# Patient Record
Sex: MALE | Race: WHITE | NOT HISPANIC OR LATINO | Employment: FULL TIME | RURAL
[De-identification: names, ages, dates, MRNs, and addresses within clinical notes are randomized per-mention and may not be internally consistent; named-entity substitution may affect disease eponyms.]

---

## 2020-12-05 ENCOUNTER — HISTORICAL (OUTPATIENT)
Dept: ADMINISTRATIVE | Facility: HOSPITAL | Age: 30
End: 2020-12-05

## 2020-12-05 LAB
ALBUMIN SERPL BCP-MCNC: 4.2 G/DL (ref 3.5–5)
ALP SERPL-CCNC: 94 U/L (ref 45–115)
ALT SERPL W P-5'-P-CCNC: 73 U/L (ref 16–61)
AST SERPL W P-5'-P-CCNC: 50 U/L (ref 15–37)
BILIRUB DIRECT SERPL-MCNC: 0.1 MG/DL (ref 0–0.2)
BILIRUB SERPL-MCNC: 0.6 MG/DL (ref 0–1.2)
PROT SERPL-MCNC: 8.3 G/DL (ref 6.4–8.2)

## 2022-01-28 ENCOUNTER — OFFICE VISIT (OUTPATIENT)
Dept: FAMILY MEDICINE | Facility: CLINIC | Age: 32
End: 2022-01-28
Payer: OTHER GOVERNMENT

## 2022-01-28 VITALS
HEART RATE: 83 BPM | HEIGHT: 71 IN | BODY MASS INDEX: 32.2 KG/M2 | WEIGHT: 230 LBS | TEMPERATURE: 98 F | OXYGEN SATURATION: 99 % | RESPIRATION RATE: 18 BRPM

## 2022-01-28 DIAGNOSIS — J02.9 SORE THROAT: ICD-10-CM

## 2022-01-28 DIAGNOSIS — J03.80 ACUTE BACTERIAL TONSILLITIS: Primary | ICD-10-CM

## 2022-01-28 DIAGNOSIS — B96.89 ACUTE BACTERIAL TONSILLITIS: Primary | ICD-10-CM

## 2022-01-28 PROBLEM — M75.50 SUBACROMIAL BURSITIS: Status: ACTIVE | Noted: 2022-01-28

## 2022-01-28 PROBLEM — M54.50 LOW BACK PAIN: Status: ACTIVE | Noted: 2022-01-28

## 2022-01-28 PROBLEM — G47.00 INSOMNIA: Status: ACTIVE | Noted: 2022-01-28

## 2022-01-28 LAB
CTP QC/QA: YES
S PYO RRNA THROAT QL PROBE: NEGATIVE

## 2022-01-28 PROCEDURE — 87880 POCT RAPID STREP A: ICD-10-PCS | Mod: QW,,, | Performed by: NURSE PRACTITIONER

## 2022-01-28 PROCEDURE — 99203 PR OFFICE/OUTPT VISIT, NEW, LEVL III, 30-44 MIN: ICD-10-PCS | Mod: ,,, | Performed by: NURSE PRACTITIONER

## 2022-01-28 PROCEDURE — 87880 STREP A ASSAY W/OPTIC: CPT | Mod: QW,,, | Performed by: NURSE PRACTITIONER

## 2022-01-28 PROCEDURE — 99203 OFFICE O/P NEW LOW 30 MIN: CPT | Mod: ,,, | Performed by: NURSE PRACTITIONER

## 2022-01-28 RX ORDER — LORATADINE PSEUDOEPHEDRINE SULFATE 10; 240 MG/1; MG/1
1 TABLET, EXTENDED RELEASE ORAL NIGHTLY
Qty: 10 TABLET | Refills: 0 | Status: SHIPPED | OUTPATIENT
Start: 2022-01-28 | End: 2022-02-07

## 2022-01-28 RX ORDER — AZITHROMYCIN 250 MG/1
TABLET, FILM COATED ORAL
Qty: 6 TABLET | Refills: 0 | Status: SHIPPED | OUTPATIENT
Start: 2022-01-28

## 2022-01-28 NOTE — LETTER
[address] [facility] [address]  Phone: [phone]  Fax: [phone]          Return to Work/School    Patient: Jayjay Chaves  YOB: 1990   Date: 01/28/2022     To Whom It May Concern:     Jayjay Chaves was in contact with/seen in my office on 01/28/2022. COVID-19 is present in our communities across the [facility]. There is limited testing for COVID at this time, so not all patients can be tested. In this situation, your employee meets the following criteria:     Jayjay Chaves has not been tested for COVID-19. He can return to work once they are asymptomatic for 24 hours without the use of fever reducing medications (Tylenol, Motrin, etc). Infection control policies of the employer should be followed, as well as good hand hygiene.     If you have any questions or concerns, or if I can be of further assistance, please do not hesitate to contact me.     Sincerely,    CHEMA Ervin

## 2022-01-28 NOTE — PATIENT INSTRUCTIONS
-Repeat COVID testing in 2-3 days if symptoms do not improve or worsen.  -Wear a mask when around other for the next 10 days.  -OTC tylenol or ibuprofen as needed for temperature 100.4 or greater.  -Throw toothbrush away in 2 days and replace.

## 2022-01-28 NOTE — PROGRESS NOTES
[facility]    Chief Complaint      Chief Complaint   Patient presents with    Fever    Nasal Congestion    Sore Throat    Nausea    Headache    Generalized Body Aches     X 4 Days     History of Present Illness      Jayjay Chaves is a 31 y.o. male who presents today for c/o URI symptoms x4 days. He refuses COVID testing at this time. He had a negative rapid test 01/25 and then had a negative PCR test. He denies any known exposure to COVID 19. He has been afebrile since 01/26/22.  .  URI   This is a new problem. The current episode started in the past 7 days. The problem has been gradually improving. The maximum temperature recorded prior to his arrival was 101 - 101.9 F. The fever has been present for less than 1 day. Associated symptoms include congestion, headaches, nausea and a sore throat. Pertinent negatives include no abdominal pain, chest pain, coughing, dysuria, ear pain, rash or vomiting. He has tried increased fluids for the symptoms. The treatment provided mild relief.     Past Medical History:  History reviewed. No pertinent past medical history.    Past Surgical History:   has no past surgical history on file.    Social History:  Social History     Tobacco Use    Smoking status: Never Smoker    Smokeless tobacco: Never Used   Substance Use Topics    Alcohol use: Never    Drug use: Never     I personally reviewed all past medical, surgical, and social.     Review of Systems   Constitutional: Positive for fever. Negative for chills and malaise/fatigue.   HENT: Positive for congestion and sore throat. Negative for ear pain.    Eyes: Negative for discharge and redness.   Respiratory: Negative for cough and shortness of breath.    Cardiovascular: Negative for chest pain, palpitations and leg swelling.   Gastrointestinal: Positive for nausea. Negative for abdominal pain and vomiting.   Genitourinary: Negative for dysuria.   Musculoskeletal: Negative for myalgias.   Skin: Negative for  "itching and rash.   Neurological: Positive for headaches. Negative for dizziness and weakness.   Endo/Heme/Allergies: Does not bruise/bleed easily.   Psychiatric/Behavioral: Negative for suicidal ideas.      Medications:  Outpatient Encounter Medications as of 1/28/2022   Medication Sig Dispense Refill    azithromycin (ZITHROMAX Z-TRENT) 250 MG tablet Take 2 tablets on day #1, then 1 tablet on days 2-5 6 tablet 0    loratadine-pseudoephedrine  mg (CLARITIN-D 24 HOUR)  mg per 24 hr tablet Take 1 tablet by mouth every evening. for 10 days 10 tablet 0     No facility-administered encounter medications on file as of 1/28/2022.     Allergies:  Review of patient's allergies indicates:  No Known Allergies    Health Maintenance:    There is no immunization history on file for this patient.   Health Maintenance   Topic Date Due    Hepatitis C Screening  Never done    Lipid Panel  Never done    TETANUS VACCINE  Never done      Physical Exam      Vital Signs  Temp: 98.4 °F (36.9 °C)  Pulse: 83  Resp: 18  SpO2: 99 %  Height and Weight  Height: 5' 11" (180.3 cm)  Weight: 104.3 kg (230 lb)  BSA (Calculated - sq m): 2.29 sq meters  BMI (Calculated): 32.1  Weight in (lb) to have BMI = 25: 178.9]    Physical Exam  Vitals and nursing note reviewed.   Constitutional:       Appearance: Normal appearance.   HENT:      Head: Normocephalic.      Right Ear: External ear normal.      Left Ear: External ear normal.      Nose: Congestion present.      Mouth/Throat:      Mouth: Mucous membranes are moist.      Pharynx: Pharyngeal swelling and posterior oropharyngeal erythema present.      Tonsils: 2+ on the right. 2+ on the left.   Eyes:      Conjunctiva/sclera: Conjunctivae normal.      Pupils: Pupils are equal, round, and reactive to light.   Cardiovascular:      Rate and Rhythm: Normal rate and regular rhythm.      Pulses: Normal pulses.      Heart sounds: Normal heart sounds. No murmur heard.      Pulmonary:      Effort: " Pulmonary effort is normal. No respiratory distress.      Breath sounds: Normal breath sounds.   Abdominal:      General: Bowel sounds are normal.   Musculoskeletal:         General: Normal range of motion.      Cervical back: Normal range of motion.   Skin:     General: Skin is warm and dry.      Capillary Refill: Capillary refill takes less than 2 seconds.   Neurological:      Mental Status: He is alert and oriented to person, place, and time.   Psychiatric:         Mood and Affect: Mood normal.         Behavior: Behavior normal.         Thought Content: Thought content normal.         Judgment: Judgment normal.          Laboratory:    CMP:  Recent Labs   Lab 12/05/20  1108   Albumin 4.2   Total Protein 8.3 H   Alk Phos 94   ALT 73 H   AST 50 H   Bilirubin, Total 0.6     Assessment/Plan     Jayjay Chaves is a 31 y.o.male with:    1. Sore throat  - POCT rapid strep A    2. Acute bacterial tonsillitis  - azithromycin (ZITHROMAX Z-TRENT) 250 MG tablet; Take 2 tablets on day #1, then 1 tablet on days 2-5  Dispense: 6 tablet; Refill: 0   -OTC tylenol or ibuprofen as needed for temperature 100.4 or greater.  -Throw toothbrush away in 2 days and replace.     Chronic conditions status updated as per HPI.  Other than changes above, cont current medications and maintain follow up with specialists.  Return to clinic for repeat COVID testing in 2-3 days if symptoms do not improve or worsen.    Jaz Apodaca, P  [facility]